# Patient Record
Sex: MALE | Race: WHITE | NOT HISPANIC OR LATINO | Employment: OTHER | URBAN - METROPOLITAN AREA
[De-identification: names, ages, dates, MRNs, and addresses within clinical notes are randomized per-mention and may not be internally consistent; named-entity substitution may affect disease eponyms.]

---

## 2018-05-17 ENCOUNTER — RADIANT APPOINTMENT (OUTPATIENT)
Dept: GENERAL RADIOLOGY | Facility: CLINIC | Age: 23
End: 2018-05-17
Attending: PHYSICIAN ASSISTANT
Payer: COMMERCIAL

## 2018-05-17 ENCOUNTER — OFFICE VISIT (OUTPATIENT)
Dept: PEDIATRICS | Facility: CLINIC | Age: 23
End: 2018-05-17
Payer: COMMERCIAL

## 2018-05-17 VITALS
HEART RATE: 79 BPM | HEIGHT: 72 IN | BODY MASS INDEX: 21.96 KG/M2 | OXYGEN SATURATION: 97 % | TEMPERATURE: 98.6 F | WEIGHT: 162.14 LBS | DIASTOLIC BLOOD PRESSURE: 50 MMHG | SYSTOLIC BLOOD PRESSURE: 110 MMHG

## 2018-05-17 DIAGNOSIS — M79.641 PAIN OF RIGHT HAND: ICD-10-CM

## 2018-05-17 DIAGNOSIS — M79.641 PAIN OF RIGHT HAND: Primary | ICD-10-CM

## 2018-05-17 PROCEDURE — 73130 X-RAY EXAM OF HAND: CPT | Mod: RT

## 2018-05-17 PROCEDURE — 99213 OFFICE O/P EST LOW 20 MIN: CPT | Performed by: PHYSICIAN ASSISTANT

## 2018-05-17 NOTE — LETTER
[facility]  [address] [place] [address]  [address]  [place] [address]  Phone: [phone]  Fax: [phone]    May 17, 2018        Jose Ramos  [address]  [place] [address]          To whom it may concern:    RE: Jose Ramos    Patient was seen and treated today at our clinic.  Please excuse absences on 5/18 and 5/21/18.    Please contact me for questions or concerns.      Sincerely,        El Bishop PA-C

## 2018-05-17 NOTE — PATIENT INSTRUCTIONS
Follow up if not improving    R.I.C.E.    R.I.C.E. stands for Rest, Ice, Compression, and Elevation. Doing these things helps limit pain and swelling after an injury. R.I.C.E. also helps injuries heal faster. Use R.I.C.E. for sprains, strains, and severe bruises or bumps. Follow the tips on this handout and begin R.I.C.E. as soon as possible after an injury.  ? Rest  Pain is your body s way of telling you to rest an injured area. Whether you have hurt an elbow, hand, foot, or knee, limiting its use will prevent further injury and help you heal.  ? Ice  Applying ice right after an injury helps prevent swelling and reduce pain. Don t place ice directly on your skin.    Wrap a cold pack or bag of ice in a thin cloth. Place it over the injured area.    Ice for 10 minutes every 3 hours. Don t ice for more than 20 minutes at a time.  ? Compression  Putting pressure (compression) on an injury helps prevent swelling and provides support.    Wrap the injured area firmly with an elastic bandage. If your hand or foot tingles, becomes discolored, or feels cold to the touch, the bandage may be too tight. Rewrap it more loosely.    If your bandage becomes too loose, rewrap it.    Do not wear an elastic bandage overnight.  ? Elevation  Keeping an injury elevated helps reduce swelling, pain, and throbbing. Elevation is most effective when the injury is kept elevated higher than the heart.     Call your healthcare provider if you notice any of the following:    Fingers or toes feel numb, are cold to the touch, or change color    Skin looks shiny or tight    Pain, swelling, or bruising worsens and is not improved with elevation   Date Last Reviewed: 9/3/2015    7020-0118 [employer]. [address] [place] [address], [place] [address]. All rights reserved. This information is not intended as a substitute for professional medical care. Always follow your healthcare professional's instructions.

## 2018-05-17 NOTE — PROGRESS NOTES
"  SUBJECTIVE:   Jose Ramos is a 22 year old male who presents to clinic today for the following health issues:      Joint Pain    Onset: 3 days     Description:   Location: right hand - 3rd, 4th and 5th finger  Character: Dull ache, with more movement     Intensity: moderate, to severe at end of day     Progression of Symptoms: worse    Accompanying Signs & Symptoms:  Other symptoms: radiation of pain to outer hand , weakness of hands and fingers, swelling and discoloration of fingers and hand     History:   Previous similar pain: YES- hx of broken bones       Precipitating factors:   Trauma or overuse: YES- moving bike rack     Alleviating factors:  Improved by: ice     Therapies Tried and outcome: ice       ROS:  10 point ROS negative except as listed above      OBJECTIVE:     /50  Pulse 79  Temp 98.6  F (37  C) (Oral)  Ht 5' 11.85\" (1.825 m)  Wt 162 lb 2.2 oz (73.5 kg)  SpO2 97%  BMI 22.08 kg/m2  Body mass index is 22.08 kg/(m^2).  GENERAL: healthy, alert and no distress  CV: cap refil rapid  MS: swelling of 3-4 MCP joints, bruise on lateral 5th metacarpal  SKIN: no suspicious lesions or rashes  NEURO: Normal strength and tone, mentation intact and speech normal    Diagnostic Test Results:  Xray - WNL on initial read    ASSESSMENT/PLAN:     (M79.641) Pain of right hand  (primary encounter diagnosis)  Comment:  Initial injury 4 days ago, worst on day 1 and 2, has not rested hand since injury, improving despite continued use   Plan: XR Hand Right G/E 3 Views  RICE, sling follow up if not improving with conservative measures        El Bishop PA-C  [facility] [place]    "

## 2018-05-17 NOTE — MR AVS SNAPSHOT
After Visit Summary   5/17/2018    Jose Ramos    MRN: [id number]           Patient Information     Date Of Birth          1995        Visit Information        Provider Department      5/17/2018 2:40 PM El Bishop PA-C [facility]        Today's Diagnoses     Pain of right hand    -  1      Care Instructions    Follow up if not improving    R.I.C.E.    R.I.C.E. stands for Rest, Ice, Compression, and Elevation. Doing these things helps limit pain and swelling after an injury. R.I.C.E. also helps injuries heal faster. Use R.I.C.E. for sprains, strains, and severe bruises or bumps. Follow the tips on this handout and begin R.I.C.E. as soon as possible after an injury.  ? Rest  Pain is your body s way of telling you to rest an injured area. Whether you have hurt an elbow, hand, foot, or knee, limiting its use will prevent further injury and help you heal.  ? Ice  Applying ice right after an injury helps prevent swelling and reduce pain. Don t place ice directly on your skin.    Wrap a cold pack or bag of ice in a thin cloth. Place it over the injured area.    Ice for 10 minutes every 3 hours. Don t ice for more than 20 minutes at a time.  ? Compression  Putting pressure (compression) on an injury helps prevent swelling and provides support.    Wrap the injured area firmly with an elastic bandage. If your hand or foot tingles, becomes discolored, or feels cold to the touch, the bandage may be too tight. Rewrap it more loosely.    If your bandage becomes too loose, rewrap it.    Do not wear an elastic bandage overnight.  ? Elevation  Keeping an injury elevated helps reduce swelling, pain, and throbbing. Elevation is most effective when the injury is kept elevated higher than the heart.     Call your healthcare provider if you notice any of the following:    Fingers or toes feel numb, are cold to the touch, or change color    Skin looks shiny or tight    Pain,  "swelling, or bruising worsens and is not improved with elevation   Date Last Reviewed: 9/3/2015    1049-8311 [employer]. [address] [place] [address], [place] [address]. All rights reserved. This information is not intended as a substitute for professional medical care. Always follow your healthcare professional's instructions.                Follow-ups after your visit        Who to contact     If you have questions or need follow up information about today's clinic visit or your schedule please contact [facility] [place] directly at [phone].  Normal or non-critical lab and imaging results will be communicated to you by Sensus Experiencehart, letter or phone within 4 business days after the clinic has received the results. If you do not hear from us within 7 days, please contact the clinic through Dailysinglet or phone. If you have a critical or abnormal lab result, we will notify you by phone as soon as possible.  Submit refill requests through Telly or call your pharmacy and they will forward the refill request to us. Please allow 3 business days for your refill to be completed.          Additional Information About Your Visit        Telly Information     Telly lets you send messages to your doctor, view your test results, renew your prescriptions, schedule appointments and more. To sign up, go to [email or web address] . Click on \"Log in\" on the left side of the screen, which will take you to the Welcome page. Then click on \"Sign up Now\" on the right side of the page.     You will be asked to enter the access code listed below, as well as some personal information. Please follow the directions to create your username and password.     Your access code is: [email or web address]  Expires: 8/15/2018  3:10 PM     Your access code will  in 90 days. If you need help or a new code, please call your [place] clinic or [phone].        Care EveryWhere ID     This is your Care EveryWhere ID. This could be used by " "other organizations to access your [facility] medical records  [id number]        Your Vitals Were     Pulse Temperature Height Pulse Oximetry BMI (Body Mass Index)       79 98.6  F (37  C) (Oral) 5' 11.85\" (1.825 m) 97% 22.08 kg/m2        Blood Pressure from Last 3 Encounters:   05/17/18 110/50   09/14/16 96/74   08/25/16 108/54    Weight from Last 3 Encounters:   05/17/18 162 lb 2.2 oz (73.5 kg)   09/14/16 160 lb (72.6 kg)   08/25/16 159 lb (72.1 kg)               Primary Care Provider Office Phone # Fax #    Lenard Villanueva [phone] [address] [place] [address]        Equal Access to Services     [facility]: Hadii ednnys lopez hadasho Soomaali, waaxda luqadaha, qaybta kaalmada adeegyathomas, leon obrien . So [facility] [phone].    ATENCIÓN: Si habla español, tiene a samuel disposición servicios gratuitos de asistencia lingüística. Ortiz al [phone].    We comply with applicable federal civil rights laws and [place] laws. We do not discriminate on the basis of race, color, national origin, age, disability, sex, sexual orientation, or gender identity.            Thank you!     [facility]. Our goal is always to provide you with excellent care. Hearing back from our patients is one way we can continue to improve our services. Please take a few minutes to complete the written survey that you may receive in the mail after your visit with us. Thank you!             Your Updated Medication List - Protect others around you: Learn how to safely use, store and throw away your medicines at www.disposemymeds.org.          This list is accurate as of 5/17/18  3:10 PM.  Always use your most recent med list.                   Brand Name Dispense Instructions for use Diagnosis    IBUPROFEN           mineral oil-hydrophilic petrolatum     1 Tube    USE AS DIRECTED  OTC    Eczema       TYLENOL 325 MG tablet "   Generic drug:  acetaminophen      Take 1-2 tablets by mouth every 6 hours as needed.

## 2019-09-04 ENCOUNTER — OFFICE VISIT (OUTPATIENT)
Dept: PEDIATRICS | Facility: CLINIC | Age: 24
End: 2019-09-04
Payer: COMMERCIAL

## 2019-09-04 VITALS
OXYGEN SATURATION: 100 % | TEMPERATURE: 97 F | HEIGHT: 72 IN | HEART RATE: 85 BPM | DIASTOLIC BLOOD PRESSURE: 60 MMHG | SYSTOLIC BLOOD PRESSURE: 120 MMHG | WEIGHT: 184 LBS | BODY MASS INDEX: 24.92 KG/M2

## 2019-09-04 DIAGNOSIS — R21 RASH AND NONSPECIFIC SKIN ERUPTION: Primary | ICD-10-CM

## 2019-09-04 DIAGNOSIS — Z11.3 SCREEN FOR STD (SEXUALLY TRANSMITTED DISEASE): ICD-10-CM

## 2019-09-04 PROCEDURE — G0499 HEPB SCREEN HIGH RISK INDIV: HCPCS | Performed by: PHYSICIAN ASSISTANT

## 2019-09-04 PROCEDURE — 99214 OFFICE O/P EST MOD 30 MIN: CPT | Performed by: PHYSICIAN ASSISTANT

## 2019-09-04 PROCEDURE — 36415 COLL VENOUS BLD VENIPUNCTURE: CPT | Performed by: PHYSICIAN ASSISTANT

## 2019-09-04 PROCEDURE — 87389 HIV-1 AG W/HIV-1&-2 AB AG IA: CPT | Performed by: PHYSICIAN ASSISTANT

## 2019-09-04 PROCEDURE — 87591 N.GONORRHOEAE DNA AMP PROB: CPT | Performed by: PHYSICIAN ASSISTANT

## 2019-09-04 PROCEDURE — 87491 CHLMYD TRACH DNA AMP PROBE: CPT | Performed by: PHYSICIAN ASSISTANT

## 2019-09-04 PROCEDURE — 86780 TREPONEMA PALLIDUM: CPT | Performed by: PHYSICIAN ASSISTANT

## 2019-09-04 ASSESSMENT — MIFFLIN-ST. JEOR: SCORE: 1867.62

## 2019-09-04 NOTE — PROGRESS NOTES
Subjective     Jose Ramos is a 24 year old male who presents to clinic today for the following health issues:    HPI   Rash  Onset: 3 days     Description:   Location: hands -palms and fingers  Number of warts: multiple  Painful: no  No itching    Accompanying Signs & Symptoms:  Signs of infection: no  Felt ill over the past few days but improving  Lots of sick contacts--was at a     History:   History of trauma: no  Therapies Tried and outcome: none    Patient treated for chlamydia a few weeks ago. No tests completed. One new partner. She contacted him to get treatment. Patient took azith as directed. No symptoms then or currently.   Requesting testing today.     Review of Systems   ROS COMP: Constitutional, HEENT, cardiovascular, pulmonary, gi and gu systems are negative, except as otherwise noted.      Objective    /60 (BP Location: Right arm, Cuff Size: Adult Regular)   Pulse 85   Temp 97  F (36.1  C) (Tympanic)   Ht 1.829 m (6')   Wt 83.5 kg (184 lb)   SpO2 100%   BMI 24.95 kg/m    Body mass index is 24.95 kg/m .  Physical Exam   GENERAL: healthy, alert and no distress  SKIN: inspection of the palms of the hands and fingers reveals multiple, skin colored, flat papules. Well circumscribed. Nontender.     Diagnostic Test Results:  Results for orders placed or performed in visit on 19 (from the past 24 hour(s))   HIV Antigen Antibody Combo   Result Value Ref Range    HIV Antigen Antibody Combo Nonreactive NR^Nonreactive       Hepatitis B surface antigen   Result Value Ref Range    Hep B Surface Agn Nonreactive NR^Nonreactive   Treponema Abs w Reflex to RPR and Titer   Result Value Ref Range    Treponema Antibodies Nonreactive NR^Nonreactive           Assessment & Plan   (R21) Rash and nonspecific skin eruption  (primary encounter diagnosis)  Comment: discussed in detail. Likely viral. Very similar to molluscum. No treatments necessary. Patient will contact me if new signs or  symptoms.   Plan:     (Z11.3) Screen for STD (sexually transmitted disease)  Comment: proceed with screening. Recently treated for asymptomatic chlamydia.   Discussed HPV vaccines and patient will let us know if he would like to proceed with these.   Plan: NEISSERIA GONORRHOEA PCR, CHLAMYDIA TRACHOMATIS        PCR, HIV Antigen Antibody Combo, Hepatitis B         surface antigen, Treponema Abs w Reflex to RPR         and Titer         Terence Faustin PA-C  [facility]

## 2019-09-05 LAB
HBV SURFACE AG SERPL QL IA: NONREACTIVE
HIV 1+2 AB+HIV1 P24 AG SERPL QL IA: NONREACTIVE
T PALLIDUM AB SER QL: NONREACTIVE

## 2019-09-06 ENCOUNTER — TELEPHONE (OUTPATIENT)
Dept: PEDIATRICS | Facility: CLINIC | Age: 24
End: 2019-09-06

## 2019-09-06 LAB
C TRACH DNA SPEC QL NAA+PROBE: NEGATIVE
N GONORRHOEA DNA SPEC QL NAA+PROBE: NEGATIVE
SPECIMEN SOURCE: NORMAL
SPECIMEN SOURCE: NORMAL

## 2019-09-06 NOTE — TELEPHONE ENCOUNTER
Called patient and informed him about results. Patient wanted me to let you know that he knows what he has he thinks its eczema. Dayanna Issa, FABIANO on 9/6/2019 at 3:42 PM

## 2019-09-09 NOTE — TELEPHONE ENCOUNTER
LM for patient to call back, please notify of providers note.  Aline Hutton CMA ([facility])

## 2019-09-09 NOTE — TELEPHONE ENCOUNTER
Call patient.     Interesting. It didn't look like it at the time and it's usually itchy!     But, again, we wouldn't treat at this time.     Thanks for the update.     Terence Faustin PA-C

## 2021-10-12 ENCOUNTER — NURSE TRIAGE (OUTPATIENT)
Dept: NURSING | Facility: CLINIC | Age: 26
End: 2021-10-12

## 2021-10-12 ENCOUNTER — HOSPITAL ENCOUNTER (EMERGENCY)
Facility: CLINIC | Age: 26
Discharge: HOME | End: 2021-10-12
Attending: NURSE PRACTITIONER | Admitting: NURSE PRACTITIONER
Payer: COMMERCIAL

## 2021-10-12 VITALS
WEIGHT: 188.71 LBS | SYSTOLIC BLOOD PRESSURE: 125 MMHG | RESPIRATION RATE: 18 BRPM | HEART RATE: 93 BPM | DIASTOLIC BLOOD PRESSURE: 88 MMHG | OXYGEN SATURATION: 98 % | BODY MASS INDEX: 25.59 KG/M2 | TEMPERATURE: 98.2 F

## 2021-10-12 DIAGNOSIS — R07.89 CHEST WALL PAIN: ICD-10-CM

## 2021-10-12 PROCEDURE — 99283 EMERGENCY DEPT VISIT LOW MDM: CPT

## 2021-10-12 PROCEDURE — 93005 ELECTROCARDIOGRAM TRACING: CPT

## 2021-10-12 ASSESSMENT — ENCOUNTER SYMPTOMS
BACK PAIN: 1
FEVER: 0
FLANK PAIN: 0
SHORTNESS OF BREATH: 0
ABDOMINAL PAIN: 0
CHILLS: 0
COUGH: 0

## 2021-10-12 NOTE — TELEPHONE ENCOUNTER
"Patient calling reporting he has chest pain. States chest pain feels \"tender, sore, and tight\". Denies difficulty of breathing. Denies feeling too weak to stand. Per guideline, advised patient to be seen at the emergency department. Caller verbalized understanding. Denies further questions.      Bob Ramos RN  [facility]     COVID 19 Nurse Triage Plan/Patient Instructions    Please be aware that novel coronavirus (COVID-19) may be circulating in the community. If you develop symptoms such as fever, cough, or SOB or if you have concerns about the presence of another infection including coronavirus (COVID-19), please contact your health care provider or visit [email or web address].     Disposition/Instructions    ED Visit recommended. Follow protocol based instructions.     Bring Your Own Device:  Please also bring your smart device(s) (smart phones, tablets, laptops) and their charging cables for your personal use and to communicate with your care team during your visit.    Thank you for taking steps to prevent the spread of this virus.  o Limit your contact with others.  o Wear a simple mask to cover your cough.  o Wash your hands well and often.    Resources    [facility]: About COVID-19: [email or web address]    CDC: What to Do If You're Sick: www.cdc.gov/coronavirus/2019-ncov/about/steps-when-sick.html    CDC: Ending Home Isolation: www.cdc.gov/coronavirus/2019-ncov/hcp/disposition-in-home-patients.html     CDC: Caring for Someone: www.cdc.gov/coronavirus/2019-ncov/if-you-are-sick/care-for-someone.html     [facility]: Interim Guidance for Hospital Discharge to Home: www.health.[facility].[place]/diseases/coronavirus/hcp/hospdischarge.pdf    [facility] clinical trials (COVID-19 research studies): clinicalaffairs.[facility]/umn-clinical-trials     Below are the COVID-19 hotlines at the [place] Department of Health ([facility]). Interpreters are available.   o For health " "questions: Call 250-421-8174 or 1-951.802.3645 (7 a.m. to 7 p.m.)  o For questions about schools and childcare: Call 389-083-2396 or 1-733.351.9991 (7 a.m. to 7 p.m.)                       Reason for Disposition    [1] Intermittent  chest pain or \"angina\" AND [2] increasing in severity or frequency  (Exception: pains lasting a few seconds)    Additional Information    Negative: Severe difficulty breathing (e.g., struggling for each breath, speaks in single words)    Negative: Difficult to awaken or acting confused (e.g., disoriented, slurred speech)    Negative: Shock suspected (e.g., cold/pale/clammy skin, too weak to stand, low BP, rapid pulse)    Negative: [1] Chest pain lasts > 5 minutes AND [2] history of heart disease  (i.e., heart attack, bypass surgery, angina, angioplasty, CHF; not just a heart murmur)    Negative: [1] Chest pain lasts > 5 minutes AND [2] described as crushing, pressure-like, or heavy    Negative: [1] Chest pain lasts > 5 minutes AND [2] age > 50    Negative: [1] Chest pain lasts > 5 minutes AND [2] age > 30 AND [3] at least one cardiac risk factor (i.e., hypertension, diabetes, obesity, smoker or strong family history of heart disease)    Negative: [1] Chest pain lasts > 5 minutes AND [2] not relieved with nitroglycerin    Negative: Passed out (i.e., lost consciousness, collapsed and was not responding)    Negative: Heart beating < 50 beats per minute OR > 140 beats per minute    Negative: Visible sweat on face or sweat dripping down face    Negative: Sounds like a life-threatening emergency to the triager    Negative: SEVERE chest pain    Protocols used: CHEST PAIN-A-AH      "

## 2021-10-13 LAB
ATRIAL RATE - MUSE: 59 BPM
DIASTOLIC BLOOD PRESSURE - MUSE: NORMAL MMHG
INTERPRETATION ECG - MUSE: NORMAL
P AXIS - MUSE: 48 DEGREES
PR INTERVAL - MUSE: 124 MS
QRS DURATION - MUSE: 110 MS
QT - MUSE: 396 MS
QTC - MUSE: 392 MS
R AXIS - MUSE: 30 DEGREES
SYSTOLIC BLOOD PRESSURE - MUSE: NORMAL MMHG
T AXIS - MUSE: 35 DEGREES
VENTRICULAR RATE- MUSE: 59 BPM

## 2021-10-13 NOTE — ED PROVIDER NOTES
History   Chief Complaint:  Chest Pain     HPI   Jose Ramos is a 26 year old male with history of anxiety who presents with left sided chest tenderness that started yesterday after he fell on his back skateboarding two days ago. States he has sharp pain when he coughs or twists and that the pain is generally associated with movement. No other injury or illness reported.     Review of Systems   Constitutional: Negative for chills and fever.   Respiratory: Negative for cough and shortness of breath.    Cardiovascular: Positive for chest pain.   Gastrointestinal: Negative for abdominal pain.   Genitourinary: Negative for flank pain.   Musculoskeletal: Positive for back pain.   All other systems reviewed and are negative.    Allergies:  The patient has no known allergies.     Medications:  The patient is currently on no regular medications.    Past Medical History:     ADD  Anxiety       Family History:    Grandfather: MI (x5), first at age 35    Social History:  The patient presents to the ED with his mom.   The patient has been skateboarding for 17 years.     Physical Exam     Patient Vitals for the past 24 hrs:   BP Temp Temp src Pulse Resp SpO2 Weight   10/12/21 2138 125/88 98.2  F (36.8  C) Oral 93 18 98 % 85.6 kg (188 lb 11.4 oz)       Physical Exam  General: Alert, No obvious discomfort, well kept  Eyes: PERRL, conjunctivae pink no scleral icterus or conjunctival injection  ENT:   Moist mucus membranes, posterior oropharynx clear without erythema or exudates, No lymphadenopathy, Normal voice  Resp:  Lungs clear to auscultation bilaterally, no crackles/rubs/wheezes. Good air movement, reproducible chest wall discomfort with anterior posterior compression.  No crepitus or deformity.  CV:  Normal rate and rhythm, no murmurs/rubs/gallops  GI:  Abdomen soft and non-distended.  Normoactive BS.  No tenderness, guarding or rebound, No masses  Skin:  Warm, dry.  No rashes or petechiae  Musculoskeletal: No  peripheral edema or calf tenderness, Normal gross ROM   Neuro: Alert and oriented to person/place/time, normal sensation  Psychiatric: Normal affect, cooperative, good eye contact    Emergency Department Course   ECG  ECG obtained at 2158, ECG read at 2158  Sinus bradycardia. RSR' or QR pattern in V1 suggests right ventricular conduction delay. Borderline ECG.    Rate 59 bpm. NC interval 124 ms. QRS duration 110 ms. QT/QTc 396/392 ms. P-R-T axes 48 30 35.     Emergency Department Course:  Reviewed:  I reviewed nursing notes, vitals, past medical history, Care Everywhere and [facility]    Assessments:  2255 I obtained history and examined the patient as noted above.     Disposition:  The patient was discharged to home.     Impression & Plan     Medical Decision Making:  Jose Ramos is a 26 year old male who presents today for evaluation of left-sided chest wall discomfort.  The symptoms started approximately 1 day after he fell skateboarding landing on his back.  Pain is reproducible and exacerbated with movement.  He did have some pain with anterior posterior compression of his chest but no crepitus or deformity.  I did offer a chest x-ray however patient ultimately decided against that at this time I do feel it is reasonable as he does not have signs of pneumothorax or rib fracture.  He had a nonacute EKG.  I did also offer troponin which he did declined at this time as well.  Given the history and symptoms I do feel that this is a musculoskeletal injury.  Possibly a cracked rib versus rib contusion.  ACS is extremely unlikely.  He is PERC negative.  I do feel that it is appropriate for him to decline further evaluation and request discharge to home.  Follow-up and return protocols were discussed.  He is discharged to home.      Diagnosis:    ICD-10-CM    1. Chest wall pain  R07.89        Discharge Medications:  New Prescriptions    No medications on file       Scribe Disclosure:  Stacie VINES, am serving  as a scribe at 10:54 PM on 10/12/2021 to document services personally performed by Eleuterio Weaver APRN based on my observations and the provider's statements to me.      Eleuterio Weaver APRN CNP  10/12/21 [id number]

## 2021-10-13 NOTE — ED TRIAGE NOTES
Patient presents with concerns for chest tightness. Onset of pain about 2 days ago unprovoked. Left substernal chest tightness worse with movement and coughing. Pain does not radiate. Recent emotional stressors.

## 2023-01-13 ENCOUNTER — OFFICE VISIT (OUTPATIENT)
Dept: PEDIATRICS | Facility: CLINIC | Age: 28
End: 2023-01-13
Payer: COMMERCIAL

## 2023-01-13 VITALS
HEIGHT: 72 IN | HEART RATE: 67 BPM | OXYGEN SATURATION: 98 % | SYSTOLIC BLOOD PRESSURE: 130 MMHG | RESPIRATION RATE: 16 BRPM | DIASTOLIC BLOOD PRESSURE: 82 MMHG | BODY MASS INDEX: 25.64 KG/M2 | TEMPERATURE: 97.6 F | WEIGHT: 189.3 LBS

## 2023-01-13 DIAGNOSIS — G47.09 OTHER INSOMNIA: Primary | ICD-10-CM

## 2023-01-13 DIAGNOSIS — F41.1 GAD (GENERALIZED ANXIETY DISORDER): ICD-10-CM

## 2023-01-13 PROCEDURE — 99204 OFFICE O/P NEW MOD 45 MIN: CPT | Performed by: PEDIATRICS

## 2023-01-13 RX ORDER — TRAZODONE HYDROCHLORIDE 50 MG/1
50-100 TABLET, FILM COATED ORAL AT BEDTIME
Qty: 90 TABLET | Refills: 0 | Status: SHIPPED | OUTPATIENT
Start: 2023-01-13

## 2023-01-13 ASSESSMENT — ANXIETY QUESTIONNAIRES
7. FEELING AFRAID AS IF SOMETHING AWFUL MIGHT HAPPEN: SEVERAL DAYS
GAD7 TOTAL SCORE: 16
4. TROUBLE RELAXING: MORE THAN HALF THE DAYS
3. WORRYING TOO MUCH ABOUT DIFFERENT THINGS: NEARLY EVERY DAY
IF YOU CHECKED OFF ANY PROBLEMS ON THIS QUESTIONNAIRE, HOW DIFFICULT HAVE THESE PROBLEMS MADE IT FOR YOU TO DO YOUR WORK, TAKE CARE OF THINGS AT HOME, OR GET ALONG WITH OTHER PEOPLE: VERY DIFFICULT
GAD7 TOTAL SCORE: 16
5. BEING SO RESTLESS THAT IT IS HARD TO SIT STILL: MORE THAN HALF THE DAYS
1. FEELING NERVOUS, ANXIOUS, OR ON EDGE: NEARLY EVERY DAY
6. BECOMING EASILY ANNOYED OR IRRITABLE: MORE THAN HALF THE DAYS
7. FEELING AFRAID AS IF SOMETHING AWFUL MIGHT HAPPEN: SEVERAL DAYS
2. NOT BEING ABLE TO STOP OR CONTROL WORRYING: NEARLY EVERY DAY
GAD7 TOTAL SCORE: 16
8. IF YOU CHECKED OFF ANY PROBLEMS, HOW DIFFICULT HAVE THESE MADE IT FOR YOU TO DO YOUR WORK, TAKE CARE OF THINGS AT HOME, OR GET ALONG WITH OTHER PEOPLE?: VERY DIFFICULT

## 2023-01-13 ASSESSMENT — ENCOUNTER SYMPTOMS: NERVOUS/ANXIOUS: 1

## 2023-01-13 ASSESSMENT — PATIENT HEALTH QUESTIONNAIRE - PHQ9
SUM OF ALL RESPONSES TO PHQ QUESTIONS 1-9: 16
SUM OF ALL RESPONSES TO PHQ QUESTIONS 1-9: 16
10. IF YOU CHECKED OFF ANY PROBLEMS, HOW DIFFICULT HAVE THESE PROBLEMS MADE IT FOR YOU TO DO YOUR WORK, TAKE CARE OF THINGS AT HOME, OR GET ALONG WITH OTHER PEOPLE: SOMEWHAT DIFFICULT

## 2023-01-13 ASSESSMENT — PAIN SCALES - GENERAL: PAINLEVEL: NO PAIN (0)

## 2023-01-13 NOTE — PATIENT INSTRUCTIONS
[email or web address] Maps    Start trazodone - ok to take 1-2 tablets.    Other things to do:  --start some kind of exercise - consider something outside

## 2023-01-13 NOTE — PROGRESS NOTES
"  Assessment & Plan     Other insomnia  Will start by targeting sleep.  Set expectation that sleep aids are not meant to be long term. I am hopeful anxiety will improve with sleep.  We will reassess in 1 month and then further target anxiety.  Patient will consider therapy in the mean time.   - traZODone (DESYREL) 50 MG tablet; Take 1-2 tablets ( mg) by mouth At Bedtime    BENITO (generalized anxiety disorder)  As above.      BMI:   Estimated body mass index is 26.03 kg/m  as calculated from the following:    Height as of this encounter: 1.816 m (5' 11.5\").    Weight as of this encounter: 85.9 kg (189 lb 4.8 oz).       Depression Screening Follow Up    PHQ 1/13/2023   PHQ-9 Total Score 16   Q9: Thoughts of better off dead/self-harm past 2 weeks Not at all         Follow Up Actions Taken       Patient Instructions   [employer]    Start trazodone - ok to take 1-2 tablets.    Other things to do:  --start some kind of exercise - consider something outside          Return in about 4 weeks (around 2/10/2023) for Follow up, with me, using a video visit.    Jada Marin MD  [facility] MANISHA Garcia is a 27 year old accompanied by his Self , presenting for the following health issues:  Insomnia (Poor appetite, not getting enough sleep), Anxiety, Medication Request (X 10 years since he had last used adderall, patient would like to consult on taking med again or consult on alternative ), and Establish Care      Anxiety         Abnormal Mood Symptoms  Onset/Duration: long time since young adulthood   Description: more since adulthood and when father passed   Depression (if yes, do PHQ-9): yes, but states that they will not take depression medication   Anxiety (if yes, do BENITO-7): YES  Accompanying Signs & Symptoms:  Still participating in activities that you used to enjoy: YES  Fatigue: YES- and lack of motivation   Irritability: YES  Difficulty concentrating: YES  Changes " "in appetite: YES  Problems with sleep: YES  Heart racing/beating fast: YES- hear and there due to panic attacks   Abnormally elevated, expansive, or irritable mood: YES  Persistently increased activity or energy: YES  Thoughts of hurting yourself or others: No  History:  Recent stress or major life event: YES- father passing 5 years ago  Prior depression or anxiety: was recommended but never proceeding with medications usage   Family history of depression or anxiety: YES- Anxiety   Alcohol/drug use: YES- moderate to social occasion use   Difficulty sleeping: YES      Precipitating or alleviating factors: None  Therapies tried and outcome: nonePatient answers are not available for this visit.  PHQ 1/13/2023   PHQ-9 Total Score 16   Q9: Thoughts of better off dead/self-harm past 2 weeks Not at all     BENITO-7 SCORE 1/13/2023   Total Score 16 (severe anxiety)   Total Score 16       SH: Thinking about school for IT. Working in music, self anatoliy set up /take down. Lots of travel. Shift 12-18 hours. Seasonal work.  With family in winter.     Insomnia - always a night owl. Tried to go to sleep (everyting off x 1-2 hours and can't fall asleep). Sometimes up for 48+ no sleep. Off and on x 3 years. Sometimes better when working. Actually worse in winter.     Exercise - not right now, used to go to gym. \"Gym partner\" now travels a lot. Likes having someone to go with. In summer more active.     Diet - likes to cook, but lately eating lots of fast food.       Review of Systems   Psychiatric/Behavioral: The patient is nervous/anxious.             Objective    /82   Pulse 67   Temp 97.6  F (36.4  C) (Oral)   Resp 16   Ht 1.816 m (5' 11.5\")   Wt 85.9 kg (189 lb 4.8 oz)   SpO2 98%   BMI 26.03 kg/m    Body mass index is 26.03 kg/m .  Physical Exam   GENERAL APPEARANCE: healthy, alert and no distress                    "

## 2023-02-27 ENCOUNTER — VIRTUAL VISIT (OUTPATIENT)
Dept: PEDIATRICS | Facility: CLINIC | Age: 28
End: 2023-02-27
Payer: COMMERCIAL

## 2023-02-27 DIAGNOSIS — F41.1 GAD (GENERALIZED ANXIETY DISORDER): Primary | ICD-10-CM

## 2023-02-27 PROCEDURE — 99214 OFFICE O/P EST MOD 30 MIN: CPT | Mod: VID | Performed by: PEDIATRICS

## 2023-02-27 RX ORDER — BUSPIRONE HYDROCHLORIDE 10 MG/1
TABLET ORAL
Qty: 270 TABLET | Refills: 0 | Status: SHIPPED | OUTPATIENT
Start: 2023-02-27

## 2023-02-27 NOTE — PROGRESS NOTES
"Jose is a 27 year old who is being evaluated via a billable video visit.      How would you like to obtain your AVS? MyChart  If the video visit is dropped, the invitation should be resent by: Text to cell phone: [phone]  Will anyone else be joining your video visit? No        Assessment & Plan     BENITO (generalized anxiety disorder)  Uncontrolled.  Sleep improved, now will work on anxiety. Really doesn't want any med that is also an antidepressant.  See PI  - busPIRone (BUSPAR) 10 MG tablet; Take 10mg twice daily x 3 days, then 10mg in AM and 15mg in PM x 3 days, then 15mg twice daily             BMI:   Estimated body mass index is 26.03 kg/m  as calculated from the following:    Height as of 1/13/23: 1.816 m (5' 11.5\").    Weight as of 1/13/23: 85.9 kg (189 lb 4.8 oz).          Patient Instructions   Dear Jose,    Please start the buspar 10mg twice daily, then 10mg in AM and 15mg in PM, then 15mg twice daily.    FOLLOW UP with me at the beginning of May.    Jada Marin MD  Internal Medicine/Pediatrics  [facility]        Return in about 10 weeks (around 5/8/2023) for Follow up, with me, using a video visit, Depression/Anxiety.    Jada Marin MD  [facility] MANISHA    Subjective   Jose is a 27 year old, presenting for the following health issues:  No chief complaint on file.      HPI     Last visit 1/13/23 - started trazodone to help with sleep. Therapy referral given last time.     Irritability has improved since sleep has improved.  He usually gets results with 2 or 2.5 tabs when he needs it and then asleep within 20 min or so.     50mg was too low. No AM grogginess.     Doesn't take the medications if drinking.     Patient feels depression is stable, but really wants to work on anxiety. This weekend had a panic attack. Usually once per month. Always feels the something is going to go wrong. Patient really wants to stay around with antidepressants. "           Review of Systems         Objective           Vitals:  No vitals were obtained today due to virtual visit.    Physical Exam   GENERAL: Healthy, alert and no distress  EYES: Eyes grossly normal to inspection.  No discharge or erythema, or obvious scleral/conjunctival abnormalities.  RESP: No audible wheeze, cough, or visible cyanosis.  No visible retractions or increased work of breathing.    SKIN: Visible skin clear. No significant rash, abnormal pigmentation or lesions.  NEURO: Cranial nerves grossly intact.  Mentation and speech appropriate for age.  PSYCH: Mentation appears normal, affect normal/bright, judgement and insight intact, normal speech and appearance well-groomed.                Video-Visit Details    Type of service:  Video Visit   Video Start Time: 3:30pm  Video End Time:3:44 PM    Originating Location (pt. Location): Home  Distant Location (provider location):  Off-site  Platform used for Video Visit: Shai

## 2023-02-27 NOTE — PATIENT INSTRUCTIONS
Dear Jose,    Please start the buspar 10mg twice daily, then 10mg in AM and 15mg in PM, then 15mg twice daily.    FOLLOW UP with me at the beginning of May.    Jada Marin MD  Internal Medicine/Pediatrics  [facility]

## 2023-04-16 ENCOUNTER — HEALTH MAINTENANCE LETTER (OUTPATIENT)
Age: 28
End: 2023-04-16

## 2023-05-15 ENCOUNTER — VIRTUAL VISIT (OUTPATIENT)
Dept: PEDIATRICS | Facility: CLINIC | Age: 28
End: 2023-05-15
Payer: COMMERCIAL

## 2023-05-15 DIAGNOSIS — Z53.9 ERRONEOUS ENCOUNTER--DISREGARD: Primary | ICD-10-CM

## 2023-05-15 NOTE — PROGRESS NOTES
This encounter was opened in error. Please disregard.    Patient did not start medications yet. Will cancel this appt - advised to schedule follow up 4-6 weeks after starting medications.     Jada Marin MD  Internal Medicine/Pediatrics  [facility]

## 2023-05-15 NOTE — PROGRESS NOTES
"Jose is a 27 year old who is being evaluated via a billable video visit.      How would you like to obtain your AVS? {AVS Preference:744274}  If the video visit is dropped, the invitation should be resent by: {video visit invitation (Optional) :353442}  Will anyone else be joining your video visit? {:627142}  {If patient encounters technical issues they should call [phone] :658834}        {PROVIDER CHARTING PREFERENCE:601759}    Subjective   Jose is a 27 year old, presenting for the following health issues:  No chief complaint on file.         View : No data to display.              HPI     {SUPERLIST (Optional):811452}  {additonal problems for provider to add (Optional):714996}      Review of Systems   {ROS COMP (Optional):453420}      Objective           Vitals:  No vitals were obtained today due to virtual visit.    Physical Exam   {video visit exam brief selected:842285::\"GENERAL: Healthy, alert and no distress\",\"EYES: Eyes grossly normal to inspection.  No discharge or erythema, or obvious scleral/conjunctival abnormalities.\",\"RESP: No audible wheeze, cough, or visible cyanosis.  No visible retractions or increased work of breathing.  \",\"SKIN: Visible skin clear. No significant rash, abnormal pigmentation or lesions.\",\"NEURO: Cranial nerves grossly intact.  Mentation and speech appropriate for age.\",\"PSYCH: Mentation appears normal, affect normal/bright, judgement and insight intact, normal speech and appearance well-groomed.\"}    {Diagnostic Test Results (Optional):856702}    {AMBULATORY ATTESTATION (Optional):232788}        Video-Visit Details    Type of service:  Video Visit   Video Start Time: {video visit start/end time for provider to select:978855}  Video End Time:{video visit start/end time for provider to select:775573}    Originating Location (pt. Location): {video visit patient location:613911::\"Home\"}  {PROVIDER LOCATION On-site should be selected for visits conducted from your clinic " "location or adjoining [facility] hospital, academic office, or other nearby [facility] building. Off-site should be selected for all other provider locations, including home:775431}  Distant Location (provider location):  {virtual location provider:283459}  Platform used for Video Visit: {Virtual Visit Platforms:875108::\"LOVEFiLM\"}    "

## 2023-05-30 ENCOUNTER — MYC MEDICAL ADVICE (OUTPATIENT)
Dept: PEDIATRICS | Facility: CLINIC | Age: 28
End: 2023-05-30
Payer: COMMERCIAL

## 2023-09-06 ENCOUNTER — MYC MEDICAL ADVICE (OUTPATIENT)
Dept: PEDIATRICS | Facility: CLINIC | Age: 28
End: 2023-09-06
Payer: COMMERCIAL

## 2023-12-07 ENCOUNTER — MYC MEDICAL ADVICE (OUTPATIENT)
Dept: PEDIATRICS | Facility: CLINIC | Age: 28
End: 2023-12-07
Payer: COMMERCIAL

## 2024-03-11 ENCOUNTER — MYC MEDICAL ADVICE (OUTPATIENT)
Dept: PEDIATRICS | Facility: CLINIC | Age: 29
End: 2024-03-11
Payer: COMMERCIAL

## 2024-06-23 ENCOUNTER — HEALTH MAINTENANCE LETTER (OUTPATIENT)
Age: 29
End: 2024-06-23

## 2025-07-12 ENCOUNTER — HEALTH MAINTENANCE LETTER (OUTPATIENT)
Age: 30
End: 2025-07-12